# Patient Record
Sex: FEMALE | Race: WHITE | Employment: OTHER | URBAN - METROPOLITAN AREA
[De-identification: names, ages, dates, MRNs, and addresses within clinical notes are randomized per-mention and may not be internally consistent; named-entity substitution may affect disease eponyms.]

---

## 2024-09-02 ENCOUNTER — APPOINTMENT (OUTPATIENT)
Dept: RADIOLOGY | Facility: HOSPITAL | Age: 89
End: 2024-09-02
Payer: MEDICARE

## 2024-09-02 ENCOUNTER — HOSPITAL ENCOUNTER (EMERGENCY)
Facility: HOSPITAL | Age: 89
Discharge: HOME | End: 2024-09-02
Attending: EMERGENCY MEDICINE
Payer: MEDICARE

## 2024-09-02 VITALS
DIASTOLIC BLOOD PRESSURE: 65 MMHG | TEMPERATURE: 97.3 F | RESPIRATION RATE: 14 BRPM | HEART RATE: 93 BPM | OXYGEN SATURATION: 95 % | SYSTOLIC BLOOD PRESSURE: 123 MMHG

## 2024-09-02 DIAGNOSIS — S09.90XA HEAD INJURY, INITIAL ENCOUNTER: ICD-10-CM
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
## 2024-09-02 PROCEDURE — 72125 CT NECK SPINE W/O DYE: CPT | Performed by: RADIOLOGY

## 2024-09-02 PROCEDURE — 72125 CT NECK SPINE W/O DYE: CPT

## 2024-09-02 PROCEDURE — 99285 EMERGENCY DEPT VISIT HI MDM: CPT

## 2024-09-02 PROCEDURE — 70450 CT HEAD/BRAIN W/O DYE: CPT

## 2024-09-02 PROCEDURE — 70450 CT HEAD/BRAIN W/O DYE: CPT | Performed by: RADIOLOGY

## 2024-09-02 ASSESSMENT — PAIN SCALES - WONG BAKER: WONGBAKER_NUMERICALRESPONSE: HURTS EVEN MORE

## 2024-09-02 ASSESSMENT — PAIN - FUNCTIONAL ASSESSMENT: PAIN_FUNCTIONAL_ASSESSMENT: WONG-BAKER FACES

## 2024-09-02 NOTE — ED PROVIDER NOTES
HPI   Chief Complaint   Patient presents with    Fall         History limited by:  Dementia    89-year-old female presents from nursing home after a fall.  Patient had an unwitnessed fall at home the nursing home brought in by EMS.  Patient provides no additional information.      Patient History   Past Medical History:   Diagnosis Date    Arthritis     Dementia (Multi)     Dyslipidemia     GERD (gastroesophageal reflux disease)     Urinary incontinence, urge      History reviewed. No pertinent surgical history.  No family history on file.  Social History     Tobacco Use    Smoking status: Unknown    Smokeless tobacco: Not on file   Substance Use Topics    Alcohol use: Not on file    Drug use: Not on file       Physical Exam   ED Triage Vitals   Temperature Heart Rate Respirations BP   09/02/24 0434 09/02/24 0434 09/02/24 0434 09/02/24 0422   36.3 °C (97.3 °F) 99 14 143/71      Pulse Ox Temp Source Heart Rate Source Patient Position   09/02/24 0422 09/02/24 0434 -- --   94 % Temporal        BP Location FiO2 (%)     -- --             Physical Exam  General:  Awake, alert, no acute distress.  Head: Normocephalic, hematoma and abrasion left occiput  Neck: Supple, trachea midline, no stridor  Skin: Warm and dry, no rashes   Lungs: Clear to auscultation bilaterally no acute respiratory distress, speaking in full sentences without difficulty  CV: Regular Rate Rhythm with no obvious murmurs gallops rubs noted, no jugular venous distention, no pedal edema   Abdomen: Soft, nontender, nondistended, positive bowel sounds, no peritoneal signs  Neuro:  No gross focal neurologic deficits, NIH is 0  Musculoskeletal:  Full range of motion in all 4 extremities, no bony tenderness, pelvis stable  Psychiatric: Confused consistent with her dementia    ED Course & MDM   Diagnoses as of 09/02/24 0627   Fall, initial encounter   Head injury, initial encounter                 No data recorded     [place] Coma Scale Score: 8 (09/02/24 0429  : Connie Taveras RN)                           Medical Decision Making  Head C-spine CTs negative.  Patient stable for discharge back to nursing facility  Procedure  Procedures     Bao Hernandez,   09/02/24 0646

## 2024-09-02 NOTE — ED TRIAGE NOTES
Pt BIBA from [facility] in [place] for unwitnessed fall. Pt is drowsy and unable to answer questions - has hx of dementia. Staff at nursing facility were unsure if she is currently on blood thinners. Pt has C collar on upon arrival. Large hematoma noted to back of head.

## 2024-09-02 NOTE — ED NOTES
Attempted to make contact with daughter, Monica, again via home and cell phone numbers. Message left on cell phone with update and pending discharge.     Connie Taveras RN  09/02/24 [id number]

## 2024-09-02 NOTE — ED PROVIDER NOTES
Patient received in sign out from Dr Hernandez. Patient with unwitnessed fall at nursing home pending CT results.

## 2024-09-02 NOTE — DISCHARGE INSTRUCTIONS
Thank you for choosing [facility]. It was my pleasure to be involved in your care today.         As of today's visit, based on reasonable likelihood, that it is safe for you to be discharged back to your residence to follow-up as an outpatient for ongoing management of your medical problem. You should follow-up with any referrals / primary provider as soon as possible. The contacts (number, addresses) are listed below.         Important:  Even though we think it is safe for you to go home, there is always a small chance that we are missing something that could require hospitalization.  Therefore it is very important that if you get worse or develops any new symptoms that you return here as soon as possible to be re-evaluated.  This includes return of symptoms that have resolved such as fainting, chest pain, or symptoms that could be warning signs for stroke important:  Even though we think it is safe for you to go home, there is always a small chance that we are missing something that could require hospitalization.  Therefore it is very important that if you get worse or develops any new symptoms that you return here as soon as possible to be re-evaluated.  This includes return of symptoms that have resolved such as fainting, chest pain, or symptoms that could be warning signs for stroke         Make sure your pharmacy and primary doctor is aware of any new medications prescribed today.          It is your responsibility to contact as soon as possible, and follow through with, any referrals you were given today. We do recommend you inform them you are a [facility] follow-up patient, as often they can better accommodate your need to be seen, provided their schedules allow. We will, and have, made every effort to ensure you have access to adequate follow-up specialists available.          All problems may not be able to be fixed in one ER visit. This is why timely ongoing care is important, and this  is a responsibility you share in. Further, you are free to follow up with any provider you choose, and this is not limited to our suggestion.          If cultures were obtained today, you will be contacted should anything result that would require further treatment. Please contact the ED at the number provided with questions.          Having trouble affording medications? Try [email or web address]! (This is not a hospital endorsed website, merely a recommendation based on my own personal experiences with [facility])

## 2024-09-02 NOTE — ED NOTES
Called pt's daughter, Monica Her, at [phone] for update. Message left and call back number provided.     Connie Taveras RN  09/02/24 2029

## 2024-09-02 NOTE — ED NOTES
Attempted to call Soila at [facility] at [phone] to give report prior to transport back to facility - no answer, so message and call back number left. Results of CT head/neck printed and will be sent along with discharge paperwork.     Connie Taveras RN  09/02/24 5178

## 2024-09-04 RX ORDER — HYDROMORPHONE HYDROCHLORIDE 2 MG/1
TABLET ORAL
Qty: 0.1 TABLET | Refills: 0 | OUTPATIENT
Start: 2024-09-04